# Patient Record
Sex: MALE | Race: WHITE
[De-identification: names, ages, dates, MRNs, and addresses within clinical notes are randomized per-mention and may not be internally consistent; named-entity substitution may affect disease eponyms.]

---

## 2018-04-25 ENCOUNTER — HOSPITAL ENCOUNTER (OUTPATIENT)
Dept: HOSPITAL 92 - LABBT | Age: 63
Discharge: HOME | End: 2018-04-25
Attending: UROLOGY
Payer: COMMERCIAL

## 2018-04-25 DIAGNOSIS — L91.8: ICD-10-CM

## 2018-04-25 DIAGNOSIS — Z01.818: Primary | ICD-10-CM

## 2018-04-25 DIAGNOSIS — N40.0: ICD-10-CM

## 2018-04-25 LAB
ANION GAP SERPL CALC-SCNC: 12 MMOL/L (ref 10–20)
BUN SERPL-MCNC: 15 MG/DL (ref 8.4–25.7)
CALCIUM SERPL-MCNC: 9.3 MG/DL (ref 7.8–10.44)
CHLORIDE SERPL-SCNC: 108 MMOL/L (ref 98–107)
CO2 SERPL-SCNC: 23 MMOL/L (ref 23–31)
CREAT CL PREDICTED SERPL C-G-VRATE: 0 ML/MIN (ref 70–130)
GLUCOSE SERPL-MCNC: 95 MG/DL (ref 80–115)
HGB BLD-MCNC: 15 G/DL (ref 14–18)
MCH RBC QN AUTO: 30.9 PG (ref 27–31)
MCV RBC AUTO: 91.7 FL (ref 80–94)
PLATELET # BLD AUTO: 291 THOU/UL (ref 130–400)
POTASSIUM SERPL-SCNC: 4.1 MMOL/L (ref 3.5–5.1)
RBC # BLD AUTO: 4.86 MILL/UL (ref 4.7–6.1)
SODIUM SERPL-SCNC: 139 MMOL/L (ref 136–145)
WBC # BLD AUTO: 8.6 THOU/UL (ref 4.8–10.8)

## 2018-04-25 PROCEDURE — 93010 ELECTROCARDIOGRAM REPORT: CPT

## 2018-04-25 PROCEDURE — 80048 BASIC METABOLIC PNL TOTAL CA: CPT

## 2018-04-25 PROCEDURE — 93005 ELECTROCARDIOGRAM TRACING: CPT

## 2018-04-25 PROCEDURE — 85027 COMPLETE CBC AUTOMATED: CPT

## 2018-04-25 NOTE — EKG
Test Reason : 

Blood Pressure : ***/*** mmHG

Vent. Rate : 066 BPM     Atrial Rate : 066 BPM

   P-R Int : 172 ms          QRS Dur : 086 ms

    QT Int : 396 ms       P-R-T Axes : 033 068 042 degrees

   QTc Int : 415 ms

 

Normal sinus rhythm

Possible Left atrial enlargement

Borderline ECG

No previous ECGs available

Confirmed by TOÑO CHARLES ([id number]) on 4/25/2018 4:24:02 PM

 

Referred By:  SAMMY           Confirmed By:TOÑO CHARLES

## 2018-05-01 ENCOUNTER — HOSPITAL ENCOUNTER (OUTPATIENT)
Dept: HOSPITAL 92 - SDC | Age: 63
Discharge: HOME | End: 2018-05-01
Attending: UROLOGY
Payer: COMMERCIAL

## 2018-05-01 VITALS — BODY MASS INDEX: 24.6 KG/M2

## 2018-05-01 DIAGNOSIS — Z79.82: ICD-10-CM

## 2018-05-01 DIAGNOSIS — R39.11: ICD-10-CM

## 2018-05-01 DIAGNOSIS — L91.8: ICD-10-CM

## 2018-05-01 DIAGNOSIS — N40.1: Primary | ICD-10-CM

## 2018-05-01 DIAGNOSIS — Z79.899: ICD-10-CM

## 2018-05-01 DIAGNOSIS — R39.14: ICD-10-CM

## 2018-05-01 DIAGNOSIS — H81.01: ICD-10-CM

## 2018-05-01 DIAGNOSIS — R35.0: ICD-10-CM

## 2018-05-01 DIAGNOSIS — N52.9: ICD-10-CM

## 2018-05-01 DIAGNOSIS — R39.12: ICD-10-CM

## 2018-05-01 DIAGNOSIS — E78.5: ICD-10-CM

## 2018-05-01 DIAGNOSIS — Z91.040: ICD-10-CM

## 2018-05-01 PROCEDURE — 0VB08ZZ EXCISION OF PROSTATE, VIA NATURAL OR ARTIFICIAL OPENING ENDOSCOPIC: ICD-10-PCS | Performed by: UROLOGY

## 2018-05-01 PROCEDURE — 88305 TISSUE EXAM BY PATHOLOGIST: CPT

## 2018-05-01 PROCEDURE — 96374 THER/PROPH/DIAG INJ IV PUSH: CPT

## 2018-05-01 PROCEDURE — 0HB9XZZ EXCISION OF PERINEUM SKIN, EXTERNAL APPROACH: ICD-10-PCS | Performed by: UROLOGY

## 2018-05-01 NOTE — OP
DATE OF PROCEDURE:  05/01/2018

 

PREOPERATIVE DIAGNOSES:  Benign prostatic hypertrophy and skin tag.

 

POSTOPERATIVE DIAGNOSES:  Benign prostatic hypertrophy and skin tag.

 

PROCEDURES:  GreenLight laser vaporization of the prostate with enucleation of 
the middle lobe and skin tag removal. [id number] used

 

SURGEON:  Heather Stanley M.D.

 

ANESTHESIA:  General with endotracheal tube.

 

ESTIMATED BLOOD LOSS:  Less than 50 mL.

 

COMPLICATIONS:  None.



DRAIN: 20F silicone catheter 

 

FINDINGS:  Adequate resection of prostate with good stream noted at the end; 
however, there was one right-sided vein that was oozing at low pressure and 
therefore traction was used at the end of the case, but a good stream had been 
noted when the scope had been removed.

 

INDICATIONS:  The patient is a 63-year-old male, who was following up for BPH 
and elected to undergo definitive surgical therapy.

 

DESCRIPTION OF THE PROCEDURE:  The patient was brought into the room by 
Anesthesia and laid on table in supine position.  After receiving general 
anesthetic, his legs were placed in lithotomy position and his perineum was 
prepped and draped in sterile fashion.  The patient was placed in lithotomy 
position and then the area of the skin tag was prepped with alcohol and 
lidocaine 5 mL 0.5% was used for numbing and then cutting on the cautery device 
was used to excise the skin tag and coagulation was used for hemostasis and 
then the perineum was prepped and draped in sterile fashion for the GreenLight 
laser procedure.

 

Using a 22-[place] cystoscope and a 30-degree lens, the urethra was traversed.  
There was a small or a slight stricture noted that the camera passed into in 
the bulbar urethra and then the bladder was inspected.  There was a small 
diverticulum noted on the right.  The ureteral orifices were identified and 
preserved throughout the case and a power level of 80 was used at the bladder 
neck and the veru and a power level of 180 for the mid gland.  Initially, the 
middle lobe was taken down and enucleated to the bladder floor and then the 
trigonal ridge was taken down to the floor as well.  A total of [id number] joules 
were used.  A grasper was needed to remove the larger chips.  When the scope 
was removed, a good stream was noted.  Scope was put back in.  All chips were 
ensured to be out.  When the bladder was decompressed/at a low volume, it was 
noted that there was bleeding at the right lateral portion.  Attempts at 
vaporizing around this area and coagulation were not fully successful when the 
bladder was fully decompressed.  So, at this point, I removed the scope, put a 
silicone 20-[place] catheter in and then blew the balloon up larger than normal 
and held pressure for approximately 5 minutes.  Then, I reduced the balloon 
size and kept it on traction for approximately 15 minutes in the PACU area.  
Other than the venous bleeding, the patient tolerated the procedure well and 
there were no complications.  He was awakened and transferred to the PACU in 
stable condition.

 

CRISTINA

## 2018-09-17 ENCOUNTER — HOSPITAL ENCOUNTER (OUTPATIENT)
Dept: HOSPITAL 92 - LABBT | Age: 63
Discharge: HOME | End: 2018-09-17
Attending: SURGERY
Payer: COMMERCIAL

## 2018-09-17 DIAGNOSIS — K40.20: ICD-10-CM

## 2018-09-17 DIAGNOSIS — Z01.818: Primary | ICD-10-CM

## 2018-09-17 LAB
ALBUMIN SERPL BCG-MCNC: 4.4 G/DL (ref 3.4–4.8)
ALP SERPL-CCNC: 82 U/L (ref 40–150)
ALT SERPL W P-5'-P-CCNC: 16 U/L (ref 8–55)
ANION GAP SERPL CALC-SCNC: 15 MMOL/L (ref 10–20)
AST SERPL-CCNC: 21 U/L (ref 5–34)
BASOPHILS # BLD AUTO: 0.1 THOU/UL (ref 0–0.2)
BASOPHILS NFR BLD AUTO: 1.1 % (ref 0–1)
BILIRUB SERPL-MCNC: 0.7 MG/DL (ref 0.2–1.2)
BUN SERPL-MCNC: 16 MG/DL (ref 8.4–25.7)
CALCIUM SERPL-MCNC: 9.4 MG/DL (ref 7.8–10.44)
CHLORIDE SERPL-SCNC: 108 MMOL/L (ref 98–107)
CO2 SERPL-SCNC: 23 MMOL/L (ref 23–31)
CREAT CL PREDICTED SERPL C-G-VRATE: 0 ML/MIN (ref 70–130)
EOSINOPHIL # BLD AUTO: 0.1 THOU/UL (ref 0–0.7)
EOSINOPHIL NFR BLD AUTO: 1.6 % (ref 0–10)
GLOBULIN SER CALC-MCNC: 2 G/DL (ref 2.4–3.5)
GLUCOSE SERPL-MCNC: 94 MG/DL (ref 80–115)
HGB BLD-MCNC: 14.7 G/DL (ref 14–18)
LYMPHOCYTES # BLD: 1.5 THOU/UL (ref 1.2–3.4)
LYMPHOCYTES NFR BLD AUTO: 27.1 % (ref 21–51)
MCH RBC QN AUTO: 30.2 PG (ref 27–31)
MCV RBC AUTO: 92.3 FL (ref 78–98)
MONOCYTES # BLD AUTO: 0.3 THOU/UL (ref 0.11–0.59)
MONOCYTES NFR BLD AUTO: 4.6 % (ref 0–10)
NEUTROPHILS # BLD AUTO: 3.7 THOU/UL (ref 1.4–6.5)
NEUTROPHILS NFR BLD AUTO: 65.6 % (ref 42–75)
PLATELET # BLD AUTO: 288 THOU/UL (ref 130–400)
POTASSIUM SERPL-SCNC: 4 MMOL/L (ref 3.5–5.1)
RBC # BLD AUTO: 4.88 MILL/UL (ref 4.7–6.1)
SODIUM SERPL-SCNC: 142 MMOL/L (ref 136–145)
WBC # BLD AUTO: 5.6 THOU/UL (ref 4.8–10.8)

## 2018-09-17 PROCEDURE — 93010 ELECTROCARDIOGRAM REPORT: CPT

## 2018-09-17 PROCEDURE — 85025 COMPLETE CBC W/AUTO DIFF WBC: CPT

## 2018-09-17 PROCEDURE — 93005 ELECTROCARDIOGRAM TRACING: CPT

## 2018-09-17 PROCEDURE — 80053 COMPREHEN METABOLIC PANEL: CPT

## 2018-09-21 ENCOUNTER — HOSPITAL ENCOUNTER (OUTPATIENT)
Dept: HOSPITAL 92 - SDC | Age: 63
Discharge: HOME | End: 2018-09-21
Attending: SURGERY
Payer: COMMERCIAL

## 2018-09-21 VITALS — BODY MASS INDEX: 23.7 KG/M2

## 2018-09-21 DIAGNOSIS — H81.01: ICD-10-CM

## 2018-09-21 DIAGNOSIS — E78.5: ICD-10-CM

## 2018-09-21 DIAGNOSIS — Z79.82: ICD-10-CM

## 2018-09-21 DIAGNOSIS — Z91.040: ICD-10-CM

## 2018-09-21 DIAGNOSIS — K40.20: Primary | ICD-10-CM

## 2018-09-21 DIAGNOSIS — Z79.899: ICD-10-CM

## 2018-09-21 PROCEDURE — C1781 MESH (IMPLANTABLE): HCPCS

## 2018-09-21 PROCEDURE — 0YUA0JZ SUPPLEMENT BILATERAL INGUINAL REGION WITH SYNTHETIC SUBSTITUTE, OPEN APPROACH: ICD-10-PCS | Performed by: SURGERY

## 2018-09-21 NOTE — OP
PREOPERATIVE DIAGNOSIS:  Bilateral inguinal hernia.

 

SURGEON:  Geovanni Smith M.D.

 

PROCEDURE PERFORMED:  Bilateral inguinal hernia repair with mesh.

 

INDICATIONS:  A 63-year-old male with painful groin bulge on the left, was found to have another one 
on the right.

 

FINDINGS:  Bilateral direct inguinal hernias.

 

PROCEDURE IN DETAIL:  After informed consent was obtained, the patient was taken to the operating juan
m and given general endotracheal anesthesia, placed in the supine position.  His groin was prepped an
d draped in the usual fashion.  Started on the right, local anesthesia infiltrated subcutaneously and
 deep.  A transverse inguinal incision was performed.  The subcu divided sharply.  The fascia of the 
external oblique was incised in direction of its fibers through the external ring.  Spermatic cord is
olated with a Penrose drain.  Cremasteric fibers .  No indirect component.  The floor inspec
milind.  There was a direct inguinal hernia.  This was circumscribed and reduced.  Reduction was maintai
macho utilizing a PHS hernia system.  Posterior layer was placed in the preperitoneal space laterally, 
tucked under the external oblique fascia medially, sutured the pubic tubercle.  A notch was cut out f
or the spermatic cord.  The cord was placed anatomic.  Hemostasis was assured.  The external oblique 
fascia closed over the cord with a running 3-0 Vicryl.  Tonny's closed with interrupted 3-0 Vicryl a
nd the skin closed with a running subcuticular 4-0 Rapide.  I then moved to the left side.  Again, a 
transverse skin incision.  The subcu divided sharply.  The fascia of the external oblique opened with
 in direction of its fibers.  The spermatic cord isolated with a Penrose drain.  He had a large direc
t inguinal hernia, this was circumscribed and reduced.  Through the defect, the mesh was placed.  The
 posterior layer placed in the preperitoneal space.  Anterior was laid out, tucked under the external
 oblique fascia laterally, sutured to the pubic tubercle medially, the cord placed anatomic.  The ext
ernal oblique fascia closed with a running 3-0 Vicryl.  Tonny's with interrupted 3-0 Vicryl and skin
 closed with a running subcuticular 4-0 Rapide.  Steri-Strips applied.  Sterile bandage applied.  The
 patient tolerated the procedure well and was transferred to recovery in good condition.  Sponge and 
needle count verified correct x2.

## 2018-09-21 NOTE — HP
CHIEF COMPLAINT:  Bilateral inguinal hernia.

 

HISTORY:  This is a 63-year-old male who has bilateral groin bulges, left larger than the right, beco
lalita more and more difficult to reduce, occasional pain, no previous repair.  Originally seen in July
, but at that time he had an infected sebaceous cyst.  We allowed that to be treated and resolve and 
he is now here for repair.

 

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  He has 
Meniere's disease with right hearing loss, hyperlipidemia and positional vertigo.

 

PAST SURGICAL HISTORY:  He had a vasectomy.  He has had surgery on his right eye.  He has had a catar
act removed from there.  He has had a colonoscopy in February.  He had prostate and skin tag surgery.


 

MEDICATIONS:  Tamsulosin 0.4 b.i.d., finasteride 5 mg daily, sildenafil citrate 20 mg t.i.d., aspirin
 325, Metamucil.

 

ALLERGIES:  No known drug allergies.

 

FAMILY HISTORY:  Father had coronary artery disease and Alzheimer's as well as COPD.  Mother had walter
st cancer.

 

SOCIAL HISTORY:  He is a physician.  No tobacco, occasional alcohol.  He is .

 

PHYSICAL EXAMINATION:

GENERAL:  Well-developed, well-nourished male in no apparent distress.

HEENT:  Good hair growth.  No alopecia.  Pupils equal, round, and reactive.  He does have the fixed r
ight pupil.

LUNGS:  Clear.

HEART:  Regular rate and rhythm.

ABDOMEN:  Soft.  There are no palpable masses.  

BACK:  He has a healed wound on the lower back from the sebaceous cyst.

EXTREMITIES:  Good pulses.  No pedal edema.  

GENITOURINARY:  He has bilateral inguinal hernias, left greater than right.

 

ASSESSMENT:  Bilateral inguinal hernias.

 

PLAN:  Bilateral inguinal hernia repair.

 

CONSENT:  I have discussed the planned procedure as well as given him the option of open versus lapar
oscopic.  He prefers open.  He understands the risks of bleeding, infection, injury to nerves, recurr
ence of hernias.  He gives informed consent.

## 2018-09-23 NOTE — EKG
Test Reason : 

Blood Pressure : ***/*** mmHG

Vent. Rate : 070 BPM     Atrial Rate : 070 BPM

   P-R Int : 180 ms          QRS Dur : 086 ms

    QT Int : 372 ms       P-R-T Axes : 044 007 065 degrees

   QTc Int : 401 ms

 

Sinus rhythm with occasional Premature ventricular complexes

Possible Left atrial enlargement

Nonspecific ST abnormality

Abnormal ECG

When compared with ECG of 25-APR-2018 11:49,

Premature ventricular complexes are now Present

Questionable change in QRS axis

Confirmed by JENN SERRANO ([id number]) on 9/23/2018 2:24:36 PM

 

Referred By:  FOSTER           Confirmed By:JENN SERRANO